# Patient Record
Sex: MALE | HISPANIC OR LATINO | URBAN - METROPOLITAN AREA
[De-identification: names, ages, dates, MRNs, and addresses within clinical notes are randomized per-mention and may not be internally consistent; named-entity substitution may affect disease eponyms.]

---

## 2017-04-25 ENCOUNTER — OFFICE VISIT (OUTPATIENT)
Dept: RETAIL CLINIC | Facility: CLINIC | Age: 27
End: 2017-04-25

## 2017-04-25 VITALS — OXYGEN SATURATION: 98 % | TEMPERATURE: 98.3 F | HEART RATE: 78 BPM

## 2017-04-25 DIAGNOSIS — J30.2 SEASONAL ALLERGIC RHINITIS, UNSPECIFIED ALLERGIC RHINITIS TRIGGER: Primary | ICD-10-CM

## 2017-04-25 PROCEDURE — 99202 OFFICE O/P NEW SF 15 MIN: CPT | Performed by: NURSE PRACTITIONER

## 2017-04-25 RX ORDER — LORATADINE 10 MG/1
10 TABLET ORAL DAILY
Qty: 30 TABLET | Refills: 0 | Status: SHIPPED | OUTPATIENT
Start: 2017-04-25

## 2017-04-25 RX ORDER — METHYLPREDNISOLONE ACETATE 80 MG/ML
40 INJECTION, SUSPENSION INTRA-ARTICULAR; INTRALESIONAL; INTRAMUSCULAR; SOFT TISSUE ONCE
Status: COMPLETED | OUTPATIENT
Start: 2017-04-25 | End: 2017-04-25

## 2017-04-25 RX ADMIN — METHYLPREDNISOLONE ACETATE 40 MG: 80 INJECTION, SUSPENSION INTRA-ARTICULAR; INTRALESIONAL; INTRAMUSCULAR; SOFT TISSUE at 16:28

## 2017-04-25 NOTE — PATIENT INSTRUCTIONS
Alergias  (Allergies)  La alergia ocurre cuando el cuerpo reacciona a guillaume sustancia de un modo que no es normal. Guillaume reacción alérgica puede producirse después de cualquiera de estas acciones:  · [place] algo.  · Inhalar algo.  · Tocar algo.  ¿CUÁLES SON LAS CLASES DE ALERGIAS?  Se puede ser alérgico a lo siguiente:  · Cosas que surgen solo jennifer ciertas temporadas, salma moho y polen.  · Alimentos.  · Medicamentos.  · Insectos.  · Caspa de los animales.  ¿CUÁLES SON LOS SÍNTOMAS DE LAS ALERGIAS?  · Hinchazón. Puede aparecer en los labios, la dada, la lengua, la boca o la garganta.  · Estornudos.  · Tos.  · Respiración ruidosa (sibilancias).  · Nariz tapada.  · Hormigueo en la boca.  · Guillaume erupción.  · Picazón.  · Zonas de piel hinchadas, aquino y que producen picazón (ronchas).  · Lagrimeo.  · Vómitos.  · Deposiciones líquidas (diarrea).  · Mareos.  · Desmayo o sensación de desvanecimiento.  · Problemas para respirar o tragar.  · Sensación de opresión en el pecho.  · Latidos cardíacos acelerados.  ¿CÓMO SE DIAGNOSTICAN LAS ALERGIAS?  Las alergias pueden diagnosticarse mediante lo siguiente:  · Antecedentes médicos y familiares.  · Pruebas cutáneas.  · Análisis de prieto.  · Un registro de alimentos. Un registro de alimentos incluye todos los alimentos, las bebidas y los síntomas diarios.  · Los resultados de guillaume dieta de eliminación. Esta dieta implica asegurarse de no comer determinados alimentos y luego laith qué sucede cuando comienza a comerlos de nuevo.  ¿CÓMO SE TRATAN LAS ALERGIAS?  No hay guillaume pablo para las alergias, mynor las reacciones alérgicas pueden tratarse con medicamentos. Generalmente, las reacciones graves deben tratarse en un hospital.   ¿CÓMO PUEDEN PREVENIRSE LAS REACCIONES?  La mejor manera de prevenir guillaume reacción alérgica es evitar el elemento que le causa alergia. Las vacunas y los medicamentos para la alergia también pueden ayudar a prevenir las reacciones en algunos casos.     Esta información  no tiene salma fin reemplazar el consejo del médico. Asegúrese de hacerle al médico cualquier pregunta que tenga.     Document Released: 08/20/2014 Document Revised: 01/08/2016  Elsevier Interactive Patient Education ©2016 [employer]

## 2017-04-25 NOTE — PROGRESS NOTES
Subjective   Emory Pearce is a 26 y.o. male.   Chief Complaint   Patient presents with   • Wheezing   • Allergies     History of Present Illness For past 3 weeks has had feeling of swelling & irritation in throat, some shortness of breath in the evenings, and nasal congestion. Symptoms have gotten gradually worse. Works in [place] and has been weed eating. Has tried albuterol inhaler but hasn't helped.Arrived in [place] one month ago from [place], has never been to [place] before.    The following portions of the patient's history were reviewed and updated as appropriate: allergies, current medications, past medical history, past social history, past surgical history and problem list.    Review of Systems   Constitutional: Fever: maybe.   HENT: Positive for congestion and rhinorrhea. Negative for trouble swallowing and voice change. Sore throat: a littlesore but mostly feels swollen & itchy.    Eyes: Positive for itching. Negative for discharge.   Respiratory: Positive for chest tightness.    Neurological: Dizziness: occasionally.       Objective   Vitals:    04/25/17 1810   Pulse: 78   Temp: 98.3 °F (36.8 °C)   SpO2: 98%     Physical Exam   Constitutional: He is oriented to person, place, and time. He appears well-developed and well-nourished. He does not appear ill. No distress.   HENT:   Right Ear: A middle ear effusion (clear) is present.   Left Ear: A middle ear effusion (clear) is present.   Mouth/Throat: Uvula is midline. Posterior oropharyngeal erythema (slight) present. Tonsils are 0 on the right. Tonsils are 1+ on the left.   Cardiovascular: Normal rate, regular rhythm and normal heart sounds.    Pulmonary/Chest: Effort normal and breath sounds normal. No respiratory distress.   Lymphadenopathy:     He has no cervical adenopathy.   Neurological: He is alert and oriented to person, place, and time.   Skin: Skin is warm and dry. No rash noted.           Assessment/Plan   Emory was seen today for wheezing and  allergies.    Diagnoses and all orders for this visit:    Seasonal allergic rhinitis, unspecified allergic rhinitis trigger  -     methylPREDNISolone acetate (DEPO-medrol) injection 40 mg; Inject 0.5 mL into the shoulder, thigh, or buttocks 1 (One) Time.    Other orders  -     loratadine (CLARITIN) 10 MG tablet; Take 1 tablet by mouth Daily.                   Plan of care reviewed with patient &/or caregiver at the conclusion of today's visit. Education was provided in regards to diagnosis, symptom management and any prescribed or recommended OTC medications.  Advisedt to follow up with PCP, go to Urgent Care Center, or Emergency Room if symptoms worsen. Patient and/or caregiver verbalized understanding    Maribel Leonard, TAMEKA